# Patient Record
Sex: FEMALE | Race: WHITE | Employment: FULL TIME | URBAN - METROPOLITAN AREA
[De-identification: names, ages, dates, MRNs, and addresses within clinical notes are randomized per-mention and may not be internally consistent; named-entity substitution may affect disease eponyms.]

---

## 2020-08-25 ENCOUNTER — APPOINTMENT (OUTPATIENT)
Dept: CT IMAGING | Age: 25
DRG: 392 | End: 2020-08-25
Attending: EMERGENCY MEDICINE
Payer: COMMERCIAL

## 2020-08-25 ENCOUNTER — HOSPITAL ENCOUNTER (INPATIENT)
Age: 25
LOS: 3 days | Discharge: HOME | DRG: 392 | End: 2020-08-28
Attending: EMERGENCY MEDICINE | Admitting: SURGERY
Payer: COMMERCIAL

## 2020-08-25 DIAGNOSIS — K57.20 COLONIC DIVERTICULAR ABSCESS: Primary | ICD-10-CM

## 2020-08-25 PROBLEM — K57.92 DIVERTICULITIS: Status: ACTIVE | Noted: 2020-08-25

## 2020-08-25 LAB
ALBUMIN SERPL-MCNC: 2.8 G/DL (ref 3.4–5)
ALBUMIN/GLOB SERPL: 0.7 {RATIO} (ref 0.8–1.7)
ALP SERPL-CCNC: 133 U/L (ref 45–117)
ALT SERPL-CCNC: 105 U/L (ref 13–56)
ANION GAP SERPL CALC-SCNC: 9 MMOL/L (ref 3–18)
APPEARANCE UR: CLEAR
AST SERPL-CCNC: 34 U/L (ref 10–38)
BACTERIA URNS QL MICRO: ABNORMAL /HPF
BASOPHILS # BLD: 0 K/UL (ref 0–0.1)
BASOPHILS NFR BLD: 0 % (ref 0–2)
BILIRUB SERPL-MCNC: 0.8 MG/DL (ref 0.2–1)
BILIRUB UR QL: ABNORMAL
BUN SERPL-MCNC: 6 MG/DL (ref 7–18)
BUN/CREAT SERPL: 8 (ref 12–20)
CALCIUM SERPL-MCNC: 8.6 MG/DL (ref 8.5–10.1)
CHLORIDE SERPL-SCNC: 103 MMOL/L (ref 100–111)
CO2 SERPL-SCNC: 27 MMOL/L (ref 21–32)
COLOR UR: ABNORMAL
CREAT SERPL-MCNC: 0.72 MG/DL (ref 0.6–1.3)
DIFFERENTIAL METHOD BLD: ABNORMAL
EOSINOPHIL # BLD: 0 K/UL (ref 0–0.4)
EOSINOPHIL NFR BLD: 0 % (ref 0–5)
EPITH CASTS URNS QL MICRO: ABNORMAL /LPF (ref 0–5)
ERYTHROCYTE [DISTWIDTH] IN BLOOD BY AUTOMATED COUNT: 13.5 % (ref 11.6–14.5)
GLOBULIN SER CALC-MCNC: 4.3 G/DL (ref 2–4)
GLUCOSE SERPL-MCNC: 93 MG/DL (ref 74–99)
GLUCOSE UR STRIP.AUTO-MCNC: NEGATIVE MG/DL
HCG SERPL QL: NEGATIVE
HCT VFR BLD AUTO: 33.5 % (ref 35–45)
HGB BLD-MCNC: 10.9 G/DL (ref 12–16)
HGB UR QL STRIP: NEGATIVE
KETONES UR QL STRIP.AUTO: ABNORMAL MG/DL
LEUKOCYTE ESTERASE UR QL STRIP.AUTO: ABNORMAL
LIPASE SERPL-CCNC: 401 U/L (ref 73–393)
LYMPHOCYTES # BLD: 1.8 K/UL (ref 0.9–3.6)
LYMPHOCYTES NFR BLD: 15 % (ref 21–52)
MCH RBC QN AUTO: 26.2 PG (ref 24–34)
MCHC RBC AUTO-ENTMCNC: 32.5 G/DL (ref 31–37)
MCV RBC AUTO: 80.5 FL (ref 74–97)
MONOCYTES # BLD: 1.2 K/UL (ref 0.05–1.2)
MONOCYTES NFR BLD: 10 % (ref 3–10)
MUCOUS THREADS URNS QL MICRO: ABNORMAL /LPF
NEUTS SEG # BLD: 8.8 K/UL (ref 1.8–8)
NEUTS SEG NFR BLD: 75 % (ref 40–73)
NITRITE UR QL STRIP.AUTO: NEGATIVE
PH UR STRIP: 6.5 [PH] (ref 5–8)
PLATELET # BLD AUTO: 423 K/UL (ref 135–420)
PMV BLD AUTO: 8.3 FL (ref 9.2–11.8)
POTASSIUM SERPL-SCNC: 3.1 MMOL/L (ref 3.5–5.5)
PROT SERPL-MCNC: 7.1 G/DL (ref 6.4–8.2)
PROT UR STRIP-MCNC: 30 MG/DL
RBC # BLD AUTO: 4.16 M/UL (ref 4.2–5.3)
RBC #/AREA URNS HPF: ABNORMAL /HPF (ref 0–5)
SODIUM SERPL-SCNC: 139 MMOL/L (ref 136–145)
SP GR UR REFRACTOMETRY: 1.02 (ref 1–1.03)
UROBILINOGEN UR QL STRIP.AUTO: 2 EU/DL (ref 0.2–1)
WBC # BLD AUTO: 11.8 K/UL (ref 4.6–13.2)
WBC URNS QL MICRO: ABNORMAL /HPF (ref 0–5)

## 2020-08-25 PROCEDURE — 74011000258 HC RX REV CODE- 258: Performed by: PHYSICIAN ASSISTANT

## 2020-08-25 PROCEDURE — 65660000000 HC RM CCU STEPDOWN

## 2020-08-25 PROCEDURE — 74177 CT ABD & PELVIS W/CONTRAST: CPT

## 2020-08-25 PROCEDURE — 99285 EMERGENCY DEPT VISIT HI MDM: CPT

## 2020-08-25 PROCEDURE — 81001 URINALYSIS AUTO W/SCOPE: CPT

## 2020-08-25 PROCEDURE — 84703 CHORIONIC GONADOTROPIN ASSAY: CPT

## 2020-08-25 PROCEDURE — 74011000636 HC RX REV CODE- 636: Performed by: EMERGENCY MEDICINE

## 2020-08-25 PROCEDURE — 87635 SARS-COV-2 COVID-19 AMP PRB: CPT

## 2020-08-25 PROCEDURE — 83690 ASSAY OF LIPASE: CPT

## 2020-08-25 PROCEDURE — 74011250636 HC RX REV CODE- 250/636: Performed by: PHYSICIAN ASSISTANT

## 2020-08-25 PROCEDURE — 85025 COMPLETE CBC W/AUTO DIFF WBC: CPT

## 2020-08-25 PROCEDURE — 80053 COMPREHEN METABOLIC PANEL: CPT

## 2020-08-25 PROCEDURE — 74011250636 HC RX REV CODE- 250/636: Performed by: SURGERY

## 2020-08-25 RX ORDER — OXYCODONE AND ACETAMINOPHEN 5; 325 MG/1; MG/1
1 TABLET ORAL
Status: DISCONTINUED | OUTPATIENT
Start: 2020-08-25 | End: 2020-08-28 | Stop reason: HOSPADM

## 2020-08-25 RX ORDER — SODIUM CHLORIDE 0.9 % (FLUSH) 0.9 %
5-40 SYRINGE (ML) INJECTION AS NEEDED
Status: DISCONTINUED | OUTPATIENT
Start: 2020-08-25 | End: 2020-08-28 | Stop reason: HOSPADM

## 2020-08-25 RX ORDER — DEXTROSE, SODIUM CHLORIDE, AND POTASSIUM CHLORIDE 5; .45; .15 G/100ML; G/100ML; G/100ML
125 INJECTION INTRAVENOUS CONTINUOUS
Status: DISCONTINUED | OUTPATIENT
Start: 2020-08-25 | End: 2020-08-28 | Stop reason: HOSPADM

## 2020-08-25 RX ORDER — ONDANSETRON 2 MG/ML
4 INJECTION INTRAMUSCULAR; INTRAVENOUS
Status: DISCONTINUED | OUTPATIENT
Start: 2020-08-25 | End: 2020-08-28 | Stop reason: HOSPADM

## 2020-08-25 RX ORDER — ACETAMINOPHEN 325 MG/1
650 TABLET ORAL
Status: DISCONTINUED | OUTPATIENT
Start: 2020-08-25 | End: 2020-08-28 | Stop reason: HOSPADM

## 2020-08-25 RX ADMIN — IOPAMIDOL 100 ML: 612 INJECTION, SOLUTION INTRAVENOUS at 17:46

## 2020-08-25 RX ADMIN — PIPERACILLIN AND TAZOBACTAM 3.38 G: 3; .375 INJECTION, POWDER, LYOPHILIZED, FOR SOLUTION INTRAVENOUS at 19:42

## 2020-08-25 RX ADMIN — DEXTROSE MONOHYDRATE, SODIUM CHLORIDE, AND POTASSIUM CHLORIDE 125 ML/HR: 50; 4.5; 1.49 INJECTION, SOLUTION INTRAVENOUS at 22:54

## 2020-08-25 NOTE — ED PROVIDER NOTES
EMERGENCY DEPARTMENT HISTORY AND PHYSICAL EXAM    Date: 8/25/2020  Patient Name: Kassandra Harrison    History of Presenting Illness     Chief Complaint   Patient presents with    Abdominal Pain         History Provided By: Patient    Chief Complaint: abdominal pain    HPI(Context):   3:21 PM  Kassandra Harrison is a 30 y.o. female with PMHX of asthma who presents to the emergency department C/O abdominal pain. Associated sxs include nausea and vomiting. Sxs x 3 days. Reports pain to lower quadrants of abdomen. Worst in RLQ. Pain radiates to back. 3/10 currently. Pt seen at [facility] and told to come to ED to r/o appy. Pt tested positive for COVID on 08/14/2020. Reports her cough, fever, and myalgias have since resolved. Pt denies fever, chills, diarrhea, constipation, vaginal discharge, vaginal bleeding, dysuria, hemturia, and any other sxs or complaints. LMP 3 weeks ago. PCP: MATHEW Dean    Current Facility-Administered Medications   Medication Dose Route Frequency Provider Last Rate Last Dose    piperacillin-tazobactam (ZOSYN) 3.375 g in 0.9% sodium chloride (MBP/ADV) 100 mL MBP  3.375 g IntraVENous Q6H Pantera Pizarro PA         Current Outpatient Medications   Medication Sig Dispense Refill    fluticasone propion-salmeteroL (ADVAIR HFA) 230-21 mcg/actuation inhaler Take 1 Puff by inhalation daily. Past History     Past Medical History:  Past Medical History:   Diagnosis Date    Asthma        Past Surgical History:  Past Surgical History:   Procedure Laterality Date    HX ORTHOPAEDIC Left     wrist       Family History:  History reviewed. No pertinent family history. Social History:  Social History     Tobacco Use    Smoking status: Never Smoker    Smokeless tobacco: Never Used   Substance Use Topics    Alcohol use: Yes    Drug use: Never       Allergies:  No Known Allergies      Review of Systems   Review of Systems   Constitutional: Negative for chills and fever. Gastrointestinal: Positive for abdominal pain, nausea and vomiting. Negative for constipation and diarrhea. Genitourinary: Negative for dysuria, hematuria, vaginal bleeding and vaginal discharge. Musculoskeletal: Positive for back pain. Negative for myalgias. All other systems reviewed and are negative. Physical Exam     Vitals:    08/25/20 1513   BP: 128/73   Pulse: (!) 114   Resp: 18   Temp: 98.7 °F (37.1 °C)   SpO2: 96%   Weight: 90.7 kg (200 lb)   Height: 5' 3\" (1.6 m)     Physical Exam  Vitals signs and nursing note reviewed. Constitutional:       General: She is not in acute distress. Appearance: She is well-developed. She is not diaphoretic. Comments:  female in NAD. Alert. Appears comfortable. HENT:      Head: Normocephalic and atraumatic. Right Ear: External ear normal.      Left Ear: External ear normal.      Nose: Nose normal.   Eyes:      Conjunctiva/sclera: Conjunctivae normal.   Neck:      Musculoskeletal: Normal range of motion. Cardiovascular:      Rate and Rhythm: Normal rate and regular rhythm. Heart sounds: Normal heart sounds. No murmur. No friction rub. No gallop. Pulmonary:      Effort: Pulmonary effort is normal. No tachypnea, accessory muscle usage or respiratory distress. Breath sounds: Normal breath sounds. No decreased breath sounds, wheezing, rhonchi or rales. Abdominal:      Palpations: Abdomen is soft. Tenderness: There is abdominal tenderness in the right lower quadrant. There is guarding. There is no left CVA tenderness or rebound. Negative signs include McBurney's sign. Musculoskeletal: Normal range of motion. Skin:     General: Skin is warm and dry. Neurological:      Mental Status: She is alert and oriented to person, place, and time.    Psychiatric:         Judgment: Judgment normal.             Diagnostic Study Results     Labs -     Recent Results (from the past 12 hour(s))   URINALYSIS W/ RFLX MICROSCOPIC Collection Time: 08/25/20  3:15 PM   Result Value Ref Range    Color DARK YELLOW      Appearance CLEAR      Specific gravity 1.023 1.005 - 1.030      pH (UA) 6.5 5.0 - 8.0      Protein 30 (A) NEG mg/dL    Glucose Negative NEG mg/dL    Ketone TRACE (A) NEG mg/dL    Bilirubin MODERATE (A) NEG      Blood Negative NEG      Urobilinogen 2.0 (H) 0.2 - 1.0 EU/dL    Nitrites Negative NEG      Leukocyte Esterase TRACE (A) NEG     URINE MICROSCOPIC ONLY    Collection Time: 08/25/20  3:15 PM   Result Value Ref Range    WBC 2 to 5 0 - 5 /hpf    RBC 0 to 3 0 - 5 /hpf    Epithelial cells FEW 0 - 5 /lpf    Bacteria 1+ (A) NEG /hpf    Mucus 1+ (A) NEG /lpf   CBC WITH AUTOMATED DIFF    Collection Time: 08/25/20  4:00 PM   Result Value Ref Range    WBC 11.8 4.6 - 13.2 K/uL    RBC 4.16 (L) 4.20 - 5.30 M/uL    HGB 10.9 (L) 12.0 - 16.0 g/dL    HCT 33.5 (L) 35.0 - 45.0 %    MCV 80.5 74.0 - 97.0 FL    MCH 26.2 24.0 - 34.0 PG    MCHC 32.5 31.0 - 37.0 g/dL    RDW 13.5 11.6 - 14.5 %    PLATELET 568 (H) 694 - 420 K/uL    MPV 8.3 (L) 9.2 - 11.8 FL    NEUTROPHILS 75 (H) 40 - 73 %    LYMPHOCYTES 15 (L) 21 - 52 %    MONOCYTES 10 3 - 10 %    EOSINOPHILS 0 0 - 5 %    BASOPHILS 0 0 - 2 %    ABS. NEUTROPHILS 8.8 (H) 1.8 - 8.0 K/UL    ABS. LYMPHOCYTES 1.8 0.9 - 3.6 K/UL    ABS. MONOCYTES 1.2 0.05 - 1.2 K/UL    ABS. EOSINOPHILS 0.0 0.0 - 0.4 K/UL    ABS.  BASOPHILS 0.0 0.0 - 0.1 K/UL    DF AUTOMATED     METABOLIC PANEL, COMPREHENSIVE    Collection Time: 08/25/20  4:00 PM   Result Value Ref Range    Sodium 139 136 - 145 mmol/L    Potassium 3.1 (L) 3.5 - 5.5 mmol/L    Chloride 103 100 - 111 mmol/L    CO2 27 21 - 32 mmol/L    Anion gap 9 3.0 - 18 mmol/L    Glucose 93 74 - 99 mg/dL    BUN 6 (L) 7.0 - 18 MG/DL    Creatinine 0.72 0.6 - 1.3 MG/DL    BUN/Creatinine ratio 8 (L) 12 - 20      GFR est AA >60 >60 ml/min/1.73m2    GFR est non-AA >60 >60 ml/min/1.73m2    Calcium 8.6 8.5 - 10.1 MG/DL    Bilirubin, total 0.8 0.2 - 1.0 MG/DL    ALT (SGPT) 105 (H) 13 - 56 U/L    AST (SGOT) 34 10 - 38 U/L    Alk. phosphatase 133 (H) 45 - 117 U/L    Protein, total 7.1 6.4 - 8.2 g/dL    Albumin 2.8 (L) 3.4 - 5.0 g/dL    Globulin 4.3 (H) 2.0 - 4.0 g/dL    A-G Ratio 0.7 (L) 0.8 - 1.7     LIPASE    Collection Time: 08/25/20  4:00 PM   Result Value Ref Range    Lipase 401 (H) 73 - 393 U/L   HCG QL SERUM    Collection Time: 08/25/20  4:00 PM   Result Value Ref Range    HCG, Ql. Negative NEG             CT ABD PELV W CONT   Final Result   IMPRESSION:         1. 6 cm diverticular abscess with marked surrounding inflammation. This arises   from the proximal half of the sigmoid. 2. Possible groundglass infiltrates in the lung bases, indeterminate due to the   severity of respiratory motion. 3. Moderate hepatic steatosis. CT Results  (Last 48 hours)               08/25/20 1750  CT ABD PELV W CONT Final result    Impression:  IMPRESSION:           1. 6 cm diverticular abscess with marked surrounding inflammation. This arises   from the proximal half of the sigmoid. 2. Possible groundglass infiltrates in the lung bases, indeterminate due to the   severity of respiratory motion. 3. Moderate hepatic steatosis. Narrative:  EXAM: CT ABD PELV W CONT       CLINICAL INDICATION/HISTORY:  rlq abd pain.     > Additional: None       COMPARISON: None     > Correlative imaging: None. TECHNIQUE: Axial CT imaging of the abdomen and pelvis was performed with   intravenous contrast. Multiplanar reformats were generated. One or more dose   reduction techniques were used on this CT: automated exposure control,   adjustment of the mAs and/or kVp according to patient size, and iterative   reconstruction techniques. The specific techniques used on this CT exam have   been documented in the patient's electronic medical record.  Digital imaging and   communications in medicine (DICOM) format image data are available to   nonaffiliated external healthcare facilities or entities on a secure, media   free, reciprocally searchable basis with patient authorization for at least a 12   month period after this study. _______________       FINDINGS:       General comment: Exam quality is moderately degraded by motion, especially in   the lung bases and in the mid abdomen. LOWER CHEST: There is apparent groundglass density in both lung bases, although   severity of respiratory motion precludes evaluation. LIVER, BILIARY:      > Liver: Moderately diminished in attenuation diffusely.     > Biliary: Unremarkable. SPLEEN: Unremarkable. PANCREAS: Unremarkable. ADRENALS: Unremarkable. KIDNEYS/URETERS/BLADDER: Unremarkable. PELVIC ORGANS: Unremarkable. GASTROINTESTINAL TRACT: There is marked inflammation in the left side of the   pelvis which arises from the proximal half of the sigmoid. There is a large   diverticular abscess in this region with poorly defined walls but roughly 6 x 6   x 4 cm. There are multiple small gas bubbles in this region. Whether they are   contained within a large diverticulum or are extraluminal within the adjacent   fat is unknown and ultimately indeterminate. VASCULATURE: Unremarkable. LYMPH NODES: No enlarged lymph nodes. OTHER: None. MUSCULOSKELETAL: Unremarkable.       _______________               CXR Results  (Last 48 hours)    None          Medications given in the ED-  Medications   piperacillin-tazobactam (ZOSYN) 3.375 g in 0.9% sodium chloride (MBP/ADV) 100 mL MBP (has no administration in time range)   iopamidoL (ISOVUE 300) 61 % contrast injection 100 mL (100 mL IntraVENous Given 8/25/20 1746)         Medical Decision Making   I am the first provider for this patient. I reviewed the vital signs, available nursing notes, past medical history, past surgical history, family history and social history. Vital Signs-Reviewed the patient's vital signs.     Pulse Oximetry Analysis - 96% on RA. NORMAL     Records Reviewed: Nursing Notes    Provider Notes (Medical Decision Making): appy, colitis, diverticulitis, gastroenteritis, pancreatitis, hepatitis, UTI/pyelo, stone    Procedures:  Procedures    ED Course:   3:21 PM Initial assessment performed. The patients presenting problems have been discussed, and they are in agreement with the care plan formulated and outlined with them. I have encouraged them to ask questions as they arise throughout their visit. Diagnosis and Disposition       5:03 PM  Patient's presentation, labs/imaging and plan of care was reviewed with Chino Ramirez PA-C as part of sign out. They will FU on CT abdomen/pelvis as part of the plan discussed with the patient. Emmy Samantha Pizarro's assistance in completion of this plan is greatly appreciated but it should be noted that I will be the provider of record for this patient. Written by Justyna Vick PA-C    6:53 PM  Reviewed CT scan and rounds with Dr. Shanel Small ED attending. Remarkable for normal white count with shift and 6 cm diverticular abscess. Will give Zosyn and consult with general surgery  MATHEW Reed     7:02 PM  Reviewed case with Dr. Gina Francisco general surgery agrees with Zosyn and admission to his service. Likely get percutaneous drainage tomorrow. Also discussed CT findings of groundglass appearance noted in the lungs. Patient has history of known chlamydia infection with positive test 2 weeks ago. Patient has no coughing no fever no chest pain or shortness of breath. She does endorse respiratory infection however was resolved      Critical Care Time: 0    Core Measures:  For Hospitalized Patients:    1. Hospitalization Decision Time:  The decision to hospitalize the patient was made by MATHEW Reed  at 7:05 PM  on 8/25/2020    2.  Aspirin: Aspirin was not given because the patient did not present with a stroke at the time of their Emergency Department evaluation    7:05 PM  Patient is being admitted to the hospital by Dr Raya Villafuerte . The results of their tests and reasons for their admission have been discussed with them and/or available family. They convey agreement and understanding for the need to be admitted and for their admission diagnosis. CONDITIONS ON ADMISSION:  Sepsis is not present at the time of admission. Deep Vein Thrombosis is not present at the time of admission. Thrombosis is not present at the time of admission. Urinary Tract Infection is not present at the time of admission. Wound infection is not present at the time of admission. Pressure Ulcer is not present at the time of admission. CLINICAL IMPRESSION:    1. Colonic diverticular abscess              Please note that this dictation was completed with Evernote, the computer voice recognition software. Quite often unanticipated grammatical, syntax, homophones, and other interpretive errors are inadvertently transcribed by the computer software. Please disregard these errors. Please excuse any errors that have escaped final proofreading.

## 2020-08-25 NOTE — ED TRIAGE NOTES
Pt w/ c/o RIGHT to LEFT lower abd pain starting 3 days ago w/ nausea and cramping. Pt states she went to [facility] to here and was told to come here for appendicitis rule out. Pt denies fevers, chest pain, cough or SOB. Pt reports she was COVID+ on 8/14 and completed her quarantine.

## 2020-08-26 ENCOUNTER — APPOINTMENT (OUTPATIENT)
Dept: CT IMAGING | Age: 25
DRG: 392 | End: 2020-08-26
Attending: SURGERY
Payer: COMMERCIAL

## 2020-08-26 PROBLEM — U07.1 COVID-19: Status: ACTIVE | Noted: 2020-08-26

## 2020-08-26 LAB
ANION GAP SERPL CALC-SCNC: 8 MMOL/L (ref 3–18)
APTT PPP: 82.1 SEC (ref 23–36.4)
BASOPHILS # BLD: 0 K/UL (ref 0–0.1)
BASOPHILS NFR BLD: 0 % (ref 0–3)
BUN SERPL-MCNC: 6 MG/DL (ref 7–18)
BUN/CREAT SERPL: 9 (ref 12–20)
CALCIUM SERPL-MCNC: 8.3 MG/DL (ref 8.5–10.1)
CHLORIDE SERPL-SCNC: 104 MMOL/L (ref 100–111)
CO2 SERPL-SCNC: 26 MMOL/L (ref 21–32)
CREAT SERPL-MCNC: 0.69 MG/DL (ref 0.6–1.3)
DIFFERENTIAL METHOD BLD: ABNORMAL
EOSINOPHIL # BLD: 0.1 K/UL (ref 0–0.4)
EOSINOPHIL NFR BLD: 1 % (ref 0–5)
ERYTHROCYTE [DISTWIDTH] IN BLOOD BY AUTOMATED COUNT: 13.6 % (ref 11.6–14.5)
GLUCOSE SERPL-MCNC: 104 MG/DL (ref 74–99)
HCT VFR BLD AUTO: 32.2 % (ref 35–45)
HGB BLD-MCNC: 10.7 G/DL (ref 12–16)
INR PPP: 1.4 (ref 0.8–1.2)
LYMPHOCYTES # BLD: 2.3 K/UL (ref 0.8–3.5)
LYMPHOCYTES NFR BLD: 30 % (ref 20–51)
MCH RBC QN AUTO: 26.5 PG (ref 24–34)
MCHC RBC AUTO-ENTMCNC: 33.2 G/DL (ref 31–37)
MCV RBC AUTO: 79.7 FL (ref 74–97)
MONOCYTES # BLD: 0.6 K/UL (ref 0–1)
MONOCYTES NFR BLD: 8 % (ref 2–9)
NEUTS SEG # BLD: 4.7 K/UL (ref 1.8–8)
NEUTS SEG NFR BLD: 61 % (ref 42–75)
PLATELET # BLD AUTO: 454 K/UL (ref 135–420)
PLATELET COMMENTS,PCOM: ABNORMAL
PMV BLD AUTO: 9 FL (ref 9.2–11.8)
POTASSIUM SERPL-SCNC: 3.4 MMOL/L (ref 3.5–5.5)
PROTHROMBIN TIME: 17.1 SEC (ref 11.5–15.2)
RBC # BLD AUTO: 4.04 M/UL (ref 4.2–5.3)
RBC MORPH BLD: ABNORMAL
SODIUM SERPL-SCNC: 138 MMOL/L (ref 136–145)
WBC # BLD AUTO: 7.7 K/UL (ref 4.6–13.2)

## 2020-08-26 PROCEDURE — 36415 COLL VENOUS BLD VENIPUNCTURE: CPT

## 2020-08-26 PROCEDURE — 80048 BASIC METABOLIC PNL TOTAL CA: CPT

## 2020-08-26 PROCEDURE — 85610 PROTHROMBIN TIME: CPT

## 2020-08-26 PROCEDURE — 74011250636 HC RX REV CODE- 250/636: Performed by: SURGERY

## 2020-08-26 PROCEDURE — 85730 THROMBOPLASTIN TIME PARTIAL: CPT

## 2020-08-26 PROCEDURE — 74011250637 HC RX REV CODE- 250/637: Performed by: SURGERY

## 2020-08-26 PROCEDURE — 85025 COMPLETE CBC W/AUTO DIFF WBC: CPT

## 2020-08-26 PROCEDURE — 74011000258 HC RX REV CODE- 258: Performed by: SURGERY

## 2020-08-26 PROCEDURE — 65660000000 HC RM CCU STEPDOWN

## 2020-08-26 RX ORDER — LORAZEPAM 1 MG/1
1 TABLET ORAL
Status: DISCONTINUED | OUTPATIENT
Start: 2020-08-26 | End: 2020-08-28 | Stop reason: HOSPADM

## 2020-08-26 RX ADMIN — DEXTROSE MONOHYDRATE, SODIUM CHLORIDE, AND POTASSIUM CHLORIDE 125 ML/HR: 50; 4.5; 1.49 INJECTION, SOLUTION INTRAVENOUS at 06:54

## 2020-08-26 RX ADMIN — PIPERACILLIN AND TAZOBACTAM 3.38 G: 3; .375 INJECTION, POWDER, LYOPHILIZED, FOR SOLUTION INTRAVENOUS at 14:24

## 2020-08-26 RX ADMIN — PIPERACILLIN AND TAZOBACTAM 3.38 G: 3; .375 INJECTION, POWDER, LYOPHILIZED, FOR SOLUTION INTRAVENOUS at 02:58

## 2020-08-26 RX ADMIN — DEXTROSE MONOHYDRATE, SODIUM CHLORIDE, AND POTASSIUM CHLORIDE 125 ML/HR: 50; 4.5; 1.49 INJECTION, SOLUTION INTRAVENOUS at 23:42

## 2020-08-26 RX ADMIN — DEXTROSE MONOHYDRATE, SODIUM CHLORIDE, AND POTASSIUM CHLORIDE 125 ML/HR: 50; 4.5; 1.49 INJECTION, SOLUTION INTRAVENOUS at 16:00

## 2020-08-26 RX ADMIN — PIPERACILLIN AND TAZOBACTAM 3.38 G: 3; .375 INJECTION, POWDER, LYOPHILIZED, FOR SOLUTION INTRAVENOUS at 08:32

## 2020-08-26 RX ADMIN — LORAZEPAM 1 MG: 1 TABLET ORAL at 20:39

## 2020-08-26 RX ADMIN — PIPERACILLIN AND TAZOBACTAM 3.38 G: 3; .375 INJECTION, POWDER, LYOPHILIZED, FOR SOLUTION INTRAVENOUS at 20:40

## 2020-08-26 NOTE — ED NOTES
TRANSFER - OUT REPORT:    Verbal report given to Mela Kee RN (name) on [facility] Lazo  being transferred to [place] [id number] (unit) for routine progression of care       Report consisted of patients Situation, Background, Assessment and   Recommendations(SBAR). Information from the following report(s) SBAR, ED Summary and MAR was reviewed with the receiving nurse. Lines:   Peripheral IV 08/25/20 Left Hand (Active)   Site Assessment Clean, dry, & intact 08/25/20 1625   Phlebitis Assessment 0 08/25/20 1625   Infiltration Assessment 0 08/25/20 1625   Dressing Status Clean, dry, & intact 08/25/20 1625   Dressing Type Tape;Transparent 08/25/20 1625   Hub Color/Line Status Blue 08/25/20 1625   Action Taken Blood drawn 08/25/20 1625   Alcohol Cap Used Yes 08/25/20 1625       Peripheral IV 08/25/20 Right Antecubital (Active)   Site Assessment Clean, dry, & intact 08/25/20 1725   Phlebitis Assessment 0 08/25/20 1725   Infiltration Assessment 0 08/25/20 1725   Dressing Status Clean, dry, & intact 08/25/20 1725   Dressing Type Tape;Transparent 08/25/20 1725   Hub Color/Line Status Pink 08/25/20 1725   Action Taken Blood drawn 08/25/20 1725   Alcohol Cap Used Yes 08/25/20 1725        Opportunity for questions and clarification was provided. Patient transported with:   Tech     Pt to floor AOx4, SL x2 in place. Transported in a w/c. Has only had ice chips in ED approx 1 hour ago.

## 2020-08-26 NOTE — PROGRESS NOTES
1315: pt advocate made aware of pt concern on another night stay in hospital due to surgery cancelled  Maria De Jesus Gresham RN

## 2020-08-26 NOTE — ROUTINE PROCESS
Bedside and Verbal shift change report given to Zoraida Ribera RN (oncoming nurse) by Jose Das RN (offgoing nurse). Report included the following information SBAR and Kardex.

## 2020-08-26 NOTE — PROGRESS NOTES
Problem: Pressure Injury - Risk of  Goal: *Prevention of pressure injury  Description: Document Michel Scale and appropriate interventions in the flowsheet. Outcome: Progressing Towards Goal  Note: Pressure Injury Interventions:                                            Problem: Patient Education: Go to Patient Education Activity  Goal: Patient/Family Education  Outcome: Progressing Towards Goal     Problem: Airway Clearance - Ineffective  Goal: Achieve or maintain patent airway  Outcome: Progressing Towards Goal     Problem: Gas Exchange - Impaired  Goal: Absence of hypoxia  Outcome: Progressing Towards Goal  Goal: Promote optimal lung function  Outcome: Progressing Towards Goal     Problem: Gas Exchange - Impaired  Goal: Absence of hypoxia  Outcome: Progressing Towards Goal  Goal: Promote optimal lung function  Outcome: Progressing Towards Goal     Problem: Breathing Pattern - Ineffective  Goal: Ability to achieve and maintain a regular respiratory rate  Outcome: Progressing Towards Goal     Problem:  Body Temperature -  Risk of, Imbalanced  Goal: Ability to maintain a body temperature within defined limits  Outcome: Progressing Towards Goal  Goal: Will regain or maintain usual level of consciousness  Outcome: Progressing Towards Goal  Goal: Complications related to the disease process, condition or treatment will be avoided or minimized  Outcome: Progressing Towards Goal     Problem: Isolation Precautions - Risk of Spread of Infection  Goal: Prevent transmission of infectious organism to others  Outcome: Progressing Towards Goal     Problem: Nutrition Deficits  Goal: Optimize nutrtional status  Outcome: Progressing Towards Goal     Problem: Risk for Fluid Volume Deficit  Goal: Maintain normal heart rhythm  Outcome: Progressing Towards Goal  Goal: Maintain absence of muscle cramping  Outcome: Progressing Towards Goal  Goal: Maintain normal serum potassium, sodium, calcium, phosphorus, and pH  Outcome: Progressing Towards Goal     Problem: Loneliness or Risk for Loneliness  Goal: Demonstrate positive use of time alone when socialization is not possible  Outcome: Progressing Towards Goal     Problem: Fatigue  Goal: Verbalize increase energy and improved vitality  Outcome: Progressing Towards Goal     Problem: Patient Education: Go to Patient Education Activity  Goal: Patient/Family Education  Outcome: Progressing Towards Goal

## 2020-08-26 NOTE — ED NOTES
RN in to assess pt. Repositioned for comfort. Additional needs assessed. No apparent distress. No additional needs expressed at this time. Resting comfortably, awaiting further orders/procedures.       Pt given ice chips, with Ok of provider

## 2020-08-26 NOTE — ROUTINE PROCESS
Assume care of patient from Tom Giraldo RN(ED). Patient received in bed awake. Patient A&Ox4, denies pain and discomfort. No distress noted. Bed locked in low position. Call bell within reach and patient verbalized understanding of use for assistance and needs. Droplet PLUS precautions implemented. [facility] Night Shift Chart Audit Chart Audit completed? YES 
 
2325- Bedside and Verbal shift change report given to Sina Arciniega RN (oncoming nurse) by Steve Lin RN (offgoing nurse). Report included the following information SBAR, Kardex, Intake/Output, MAR and Recent Results.

## 2020-08-26 NOTE — H&P
History & Physical    Patient: Kel Humphrey MRN: [id number]  CSN: [id number]    YOB: 1995  Age: 30 y.o. Sex: female      DOA: 8/25/2020       HPI:     Kel Humphrey is a 30 y.o. female who presents with perforated diverticular abscess. Seen on CT. Past Medical History:   Diagnosis Date    Asthma        Past Surgical History:   Procedure Laterality Date    HX ORTHOPAEDIC Left     wrist       History reviewed. No pertinent family history. Social History     Socioeconomic History    Marital status: SINGLE     Spouse name: Not on file    Number of children: Not on file    Years of education: Not on file    Highest education level: Not on file   Tobacco Use    Smoking status: Never Smoker    Smokeless tobacco: Never Used   Substance and Sexual Activity    Alcohol use: Yes    Drug use: Never       Prior to Admission medications    Medication Sig Start Date End Date Taking? Authorizing Provider   fluticasone propion-salmeteroL (ADVAIR HFA) 438-46 mcg/actuation inhaler Take 1 Puff by inhalation daily. Yes Other, [facility]       No Known Allergies    Review of Systems  A comprehensive review of systems was negative except for that written in the History of Present Illness. Physical Exam:      Visit Vitals  /71   Pulse 78   Temp 98.2 °F (36.8 °C)   Resp 18   Ht 5' 3\" (1.6 m)   Wt 90.7 kg (200 lb)   SpO2 99%   BMI 35.43 kg/m²       Physical Exam:  Physical Exam:   General:  Alert, cooperative, no distress, appears stated age. Eyes:  Conjunctivae/corneas clear. PERRL, EOMs intact. Fundi benign   Ears:  Normal TMs and external ear canals both ears. Nose: Nares normal. Septum midline. Mucosa normal. No drainage or sinus tenderness. Mouth/Throat: Lips, mucosa, and tongue normal. Teeth and gums normal.   Neck: Supple, symmetrical, trachea midline, no adenopathy, thyroid: no enlargement/tenderness/nodules, no carotid bruit and no JVD. Back:   Symmetric, no curvature.  ROM normal. No CVA tenderness. Lungs:   Clear to auscultation bilaterally. Heart:  Regular rate and rhythm, S1, S2 normal, no murmur, click, rub or gallop. Abdomen:   Soft, tender LLQ. Bowel sounds normal. No masses,  No organomegaly. Extremities: Extremities normal, atraumatic, no cyanosis or edema. Pulses: 2+ and symmetric all extremities. Skin: Skin color, texture, turgor normal. No rashes or lesions   Lymph nodes: Cervical, supraclavicular, and axillary nodes normal.   Neurologic: CNII-XII intact. Normal strength, sensation and reflexes throughout. Labs Reviewed: All lab results for the last 24 hours reviewed. Assessment/Plan     Active Problems:    Colonic diverticular abscess (8/25/2020)      Diverticulitis (8/25/2020)        Plan to drain abscess by CT, IV abx.   Patient with COVID infection in past.

## 2020-08-26 NOTE — PROGRESS NOTES
80 Page Dr Fernandez Rios about pt feeling anxious and pt states she takes Klonopin but do no remember the dose. 1753 Orders received Ativan 1 mg PO every 8 hours.

## 2020-08-26 NOTE — PROGRESS NOTES
attempted phone call with patient. There was no answer. Chaplains remain available to provide pastoral support to patient and family as needed and requested. Rev.  Darren Armstrong  [phone]

## 2020-08-26 NOTE — PROGRESS NOTES
DC Plan: TBD    Chart reviewed. Pt admitted to tele by [facility] for abscess. Pt covid pending 8/25, pt noted to be covid + 8/14 at another facility. CM attempted to call pt in room to discuss dc plan, no answer. Pts listed as [facility]. CM will follow for transition of care needs [phone]    . Reason for Admission:   Per H&P pt is \"is a 30 y.o. female who presents with perforated diverticular abscess. Seen on CT. \"                   RUR Score:   low                  Plan for utilizing home health:     TBD Provider if [facility] needed please place order     PCP: First and Last name:  [facility]   Name of Practice:    Are you a current patient: Yes/No:    Approximate date of last visit:    Can you participate in a virtual visit with your PCP:                     Current Advanced Directive/Advance Care Plan: not on file                         Transition of Care Plan:      Home                  Care Management Interventions  Transition of Care Consult (CM Consult): Discharge Planning  Discharge Location  Discharge Placement: Home

## 2020-08-26 NOTE — PROGRESS NOTES
Problem: Pressure Injury - Risk of  Goal: *Prevention of pressure injury  Description: Document Michel Scale and appropriate interventions in the flowsheet. Outcome: Progressing Towards Goal  Note: Pressure Injury Interventions:                                            Problem: Patient Education: Go to Patient Education Activity  Goal: Patient/Family Education  Outcome: Progressing Towards Goal     Problem: Airway Clearance - Ineffective  Goal: Achieve or maintain patent airway  Outcome: Progressing Towards Goal     Problem: Gas Exchange - Impaired  Goal: Absence of hypoxia  Outcome: Progressing Towards Goal  Goal: Promote optimal lung function  Outcome: Progressing Towards Goal     Problem: Breathing Pattern - Ineffective  Goal: Ability to achieve and maintain a regular respiratory rate  Outcome: Progressing Towards Goal     Problem:  Body Temperature -  Risk of, Imbalanced  Goal: Ability to maintain a body temperature within defined limits  Outcome: Progressing Towards Goal  Goal: Will regain or maintain usual level of consciousness  Outcome: Progressing Towards Goal  Goal: Complications related to the disease process, condition or treatment will be avoided or minimized  Outcome: Progressing Towards Goal     Problem: Isolation Precautions - Risk of Spread of Infection  Goal: Prevent transmission of infectious organism to others  Outcome: Progressing Towards Goal     Problem: Nutrition Deficits  Goal: Optimize nutrtional status  Outcome: Progressing Towards Goal     Problem: Risk for Fluid Volume Deficit  Goal: Maintain normal heart rhythm  Outcome: Progressing Towards Goal  Goal: Maintain absence of muscle cramping  Outcome: Progressing Towards Goal  Goal: Maintain normal serum potassium, sodium, calcium, phosphorus, and pH  Outcome: Progressing Towards Goal     Problem: Loneliness or Risk for Loneliness  Goal: Demonstrate positive use of time alone when socialization is not possible  Outcome: Progressing Towards Goal     Problem: Fatigue  Goal: Verbalize increase energy and improved vitality  Outcome: Progressing Towards Goal     Problem: Patient Education: Go to Patient Education Activity  Goal: Patient/Family Education  Outcome: Progressing Towards Goal

## 2020-08-27 ENCOUNTER — HOSPITAL ENCOUNTER (INPATIENT)
Dept: CT IMAGING | Age: 25
Discharge: HOME | DRG: 392 | End: 2020-08-27
Attending: SURGERY
Payer: COMMERCIAL

## 2020-08-27 PROCEDURE — 74011000258 HC RX REV CODE- 258: Performed by: SURGERY

## 2020-08-27 PROCEDURE — 65660000000 HC RM CCU STEPDOWN

## 2020-08-27 PROCEDURE — 75989 ABSCESS DRAINAGE UNDER X-RAY: CPT

## 2020-08-27 PROCEDURE — 74011250636 HC RX REV CODE- 250/636: Performed by: SURGERY

## 2020-08-27 PROCEDURE — 0W9G3ZZ DRAINAGE OF PERITONEAL CAVITY, PERCUTANEOUS APPROACH: ICD-10-PCS | Performed by: RADIOLOGY

## 2020-08-27 RX ORDER — FENTANYL CITRATE 50 UG/ML
25-200 INJECTION, SOLUTION INTRAMUSCULAR; INTRAVENOUS
Status: DISCONTINUED | OUTPATIENT
Start: 2020-08-27 | End: 2020-08-27 | Stop reason: ALTCHOICE

## 2020-08-27 RX ORDER — MIDAZOLAM HYDROCHLORIDE 1 MG/ML
INJECTION, SOLUTION INTRAMUSCULAR; INTRAVENOUS
Status: DISCONTINUED
Start: 2020-08-27 | End: 2020-08-27 | Stop reason: WASHOUT

## 2020-08-27 RX ORDER — NALOXONE HYDROCHLORIDE 0.4 MG/ML
0.4 INJECTION, SOLUTION INTRAMUSCULAR; INTRAVENOUS; SUBCUTANEOUS AS NEEDED
Status: DISCONTINUED | OUTPATIENT
Start: 2020-08-27 | End: 2020-08-27 | Stop reason: ALTCHOICE

## 2020-08-27 RX ORDER — LIDOCAINE HYDROCHLORIDE 10 MG/ML
INJECTION INFILTRATION; PERINEURAL
Status: DISCONTINUED
Start: 2020-08-27 | End: 2020-08-27 | Stop reason: ALTCHOICE

## 2020-08-27 RX ORDER — NALOXONE HYDROCHLORIDE 0.4 MG/ML
INJECTION, SOLUTION INTRAMUSCULAR; INTRAVENOUS; SUBCUTANEOUS
Status: DISCONTINUED
Start: 2020-08-27 | End: 2020-08-27 | Stop reason: ALTCHOICE

## 2020-08-27 RX ORDER — FENTANYL CITRATE 50 UG/ML
INJECTION, SOLUTION INTRAMUSCULAR; INTRAVENOUS
Status: DISCONTINUED
Start: 2020-08-27 | End: 2020-08-27 | Stop reason: WASHOUT

## 2020-08-27 RX ORDER — FLUMAZENIL 0.1 MG/ML
0.2 INJECTION INTRAVENOUS
Status: DISCONTINUED | OUTPATIENT
Start: 2020-08-27 | End: 2020-08-27 | Stop reason: ALTCHOICE

## 2020-08-27 RX ORDER — LIDOCAINE HYDROCHLORIDE 10 MG/ML
1-20 INJECTION INFILTRATION; PERINEURAL
Status: DISCONTINUED | OUTPATIENT
Start: 2020-08-27 | End: 2020-08-27 | Stop reason: ALTCHOICE

## 2020-08-27 RX ORDER — SODIUM CHLORIDE 9 MG/ML
25 INJECTION, SOLUTION INTRAVENOUS CONTINUOUS
Status: DISCONTINUED | OUTPATIENT
Start: 2020-08-27 | End: 2020-08-27 | Stop reason: ALTCHOICE

## 2020-08-27 RX ORDER — MIDAZOLAM HYDROCHLORIDE 1 MG/ML
.5-4 INJECTION, SOLUTION INTRAMUSCULAR; INTRAVENOUS
Status: DISCONTINUED | OUTPATIENT
Start: 2020-08-27 | End: 2020-08-27 | Stop reason: ALTCHOICE

## 2020-08-27 RX ORDER — FLUMAZENIL 0.1 MG/ML
INJECTION INTRAVENOUS
Status: DISCONTINUED
Start: 2020-08-27 | End: 2020-08-27 | Stop reason: WASHOUT

## 2020-08-27 RX ADMIN — DEXTROSE MONOHYDRATE, SODIUM CHLORIDE, AND POTASSIUM CHLORIDE 125 ML/HR: 50; 4.5; 1.49 INJECTION, SOLUTION INTRAVENOUS at 18:49

## 2020-08-27 RX ADMIN — PIPERACILLIN AND TAZOBACTAM 3.38 G: 3; .375 INJECTION, POWDER, LYOPHILIZED, FOR SOLUTION INTRAVENOUS at 21:34

## 2020-08-27 RX ADMIN — DEXTROSE MONOHYDRATE, SODIUM CHLORIDE, AND POTASSIUM CHLORIDE 125 ML/HR: 50; 4.5; 1.49 INJECTION, SOLUTION INTRAVENOUS at 09:52

## 2020-08-27 RX ADMIN — PIPERACILLIN AND TAZOBACTAM 3.38 G: 3; .375 INJECTION, POWDER, LYOPHILIZED, FOR SOLUTION INTRAVENOUS at 09:52

## 2020-08-27 RX ADMIN — PIPERACILLIN AND TAZOBACTAM 3.38 G: 3; .375 INJECTION, POWDER, LYOPHILIZED, FOR SOLUTION INTRAVENOUS at 17:00

## 2020-08-27 RX ADMIN — PIPERACILLIN AND TAZOBACTAM 3.38 G: 3; .375 INJECTION, POWDER, LYOPHILIZED, FOR SOLUTION INTRAVENOUS at 02:40

## 2020-08-27 NOTE — ROUTINE PROCESS
Bedside and Verbal shift change report given to MARYLIN Montelongo RN (oncoming nurse) by Carlie Pleitez RN (offgoing nurse). Report included the following information SBAR, Kardex, Intake/Output and MAR.

## 2020-08-27 NOTE — ROUTINE PROCESS
Verbal shift change report given to Christy Pac RN by Lizzy Lau RN. Report included the following information SBAR, Kardex, Intake/Output and MAR.

## 2020-08-27 NOTE — H&P
History and Physical reviewed; I have examined the patient and there are no pertinent changes. Patient in IR for CT guided drainage of pelvic abscess.

## 2020-08-27 NOTE — PROGRESS NOTES
2040-alert and oriented x 4. Lungs CTA. BS active x 4 quads, but abdomen is tender. Patient denies pain at this time. IV access to right AC and left hand, both infusing without difficulty. On box 18 showing NSR. Patient given Ativan for anxiety and talked with her about procedure she would be having done in the morning. Patient is aware of her diet status of clear liquids now and NPO after midnight. 0615-CHG wipes completed. Marie Stinson from radiology called and will be    Shift summary-Ambulates independently, voiding without difficulty. IV ATB given with no adverse effects noted. Patient denied need for pain medication through the noc.

## 2020-08-27 NOTE — PROGRESS NOTES
1200 [facility] at this time. 2133 - Patient A&Ox4, RA. Denies chest pain and SOB. Respirations even and unlabored, no signs of distress. Pain 0/10. Pt educated on COVID-19 contact status and need to minimize contact. Pt educated on methods utilized for communication (call bell/zone phone/). Pt verbalized understanding, no concerns voiced. Call bell within reach, bed in lowest position. Pt encouraged to call for assistance. 1470 - Spoke with  regarding inability to obtain ordered labs. Phlebotomist to draw labs.

## 2020-08-27 NOTE — PROGRESS NOTES
TRANSFER - OUT REPORT:    Verbal report given to [facility], RN(name) on Nancy Phillips  being transferred to room 349(unit) for routine progression of care       Report consisted of patients Situation, Background, Assessment and   Recommendations(SBAR). Information from the following report(s) SBAR, Kardex and MAR was reviewed with the receiving nurse. Lines:   Peripheral IV 08/25/20 Left Hand (Active)   Site Assessment Clean, dry, & intact 08/26/20 2040   Phlebitis Assessment 0 08/26/20 2040   Infiltration Assessment 0 08/26/20 2040   Dressing Status Clean, dry, & intact 08/26/20 2040   Dressing Type Tape;Transparent 08/26/20 2040   Hub Color/Line Status Infusing;Blue 08/26/20 2040   Action Taken Open ports on tubing capped 08/26/20 2040   Alcohol Cap Used Yes 08/26/20 2040       Peripheral IV 08/25/20 Right Antecubital (Active)   Site Assessment Clean, dry, & intact 08/26/20 2040   Phlebitis Assessment 0 08/26/20 2040   Infiltration Assessment 0 08/26/20 2040   Dressing Status Clean, dry, & intact 08/26/20 2040   Dressing Type Tape;Transparent 08/26/20 2040   Hub Color/Line Status Pink; Infusing 08/26/20 2040   Action Taken Open ports on tubing capped 08/26/20 2040   Alcohol Cap Used Yes 08/26/20 2040        Opportunity for questions and clarification was provided.       Patient transported with:   pt  transporter

## 2020-08-27 NOTE — PROGRESS NOTES
AFVSS  Abd - less pain LLQ    CT today was improved.   Discussed with radiologist.  Will continue IV abx  Recheck CBC in AM  Hope to send home on oral abx  COVID pending

## 2020-08-27 NOTE — PROGRESS NOTES
Dr. Meghan Flores spoke with Dr. Griselda Rosario on phone regarding this morning CT images prior to planned abscess drainage. Did not proceed with procedure based on today's images. Dr. Meghan Flores updated pt to current situation, images, statuses, and plan of care. Pt verbalized understanding. Informed Niurka Hatch RN of plan of care.

## 2020-08-27 NOTE — ROUTINE PROCESS
Bedside and Verbal shift change report given to SRINI Blair RN (oncoming nurse) by Theresa Nichole RN (offgoing nurse). Report included the following information SBAR, Kardex, Intake/Output and MAR.

## 2020-08-28 VITALS
RESPIRATION RATE: 18 BRPM | WEIGHT: 200 LBS | OXYGEN SATURATION: 98 % | BODY MASS INDEX: 35.44 KG/M2 | TEMPERATURE: 98.2 F | HEIGHT: 63 IN | SYSTOLIC BLOOD PRESSURE: 118 MMHG | HEART RATE: 56 BPM | DIASTOLIC BLOOD PRESSURE: 64 MMHG

## 2020-08-28 LAB
BASOPHILS # BLD: 0 K/UL (ref 0–0.1)
BASOPHILS NFR BLD: 0 % (ref 0–2)
DIFFERENTIAL METHOD BLD: ABNORMAL
EOSINOPHIL # BLD: 0.1 K/UL (ref 0–0.4)
EOSINOPHIL NFR BLD: 3 % (ref 0–5)
ERYTHROCYTE [DISTWIDTH] IN BLOOD BY AUTOMATED COUNT: 13.2 % (ref 11.6–14.5)
HCT VFR BLD AUTO: 34.9 % (ref 35–45)
HGB BLD-MCNC: 11.3 G/DL (ref 12–16)
LYMPHOCYTES # BLD: 1.8 K/UL (ref 0.9–3.6)
LYMPHOCYTES NFR BLD: 38 % (ref 21–52)
MCH RBC QN AUTO: 26.2 PG (ref 24–34)
MCHC RBC AUTO-ENTMCNC: 32.4 G/DL (ref 31–37)
MCV RBC AUTO: 81 FL (ref 74–97)
MONOCYTES # BLD: 0.3 K/UL (ref 0.05–1.2)
MONOCYTES NFR BLD: 7 % (ref 3–10)
NEUTS SEG # BLD: 2.4 K/UL (ref 1.8–8)
NEUTS SEG NFR BLD: 52 % (ref 40–73)
PLATELET # BLD AUTO: 433 K/UL (ref 135–420)
PMV BLD AUTO: 8.7 FL (ref 9.2–11.8)
RBC # BLD AUTO: 4.31 M/UL (ref 4.2–5.3)
SARS-COV-2, COV2NT: NOT DETECTED
SOURCE, COVRS: NORMAL
SPECIMEN TYPE, XMCV1T: NORMAL
WBC # BLD AUTO: 4.7 K/UL (ref 4.6–13.2)

## 2020-08-28 PROCEDURE — 74011250636 HC RX REV CODE- 250/636: Performed by: SURGERY

## 2020-08-28 PROCEDURE — 74011000258 HC RX REV CODE- 258: Performed by: SURGERY

## 2020-08-28 PROCEDURE — 85025 COMPLETE CBC W/AUTO DIFF WBC: CPT

## 2020-08-28 RX ORDER — METRONIDAZOLE 500 MG/1
500 TABLET ORAL 3 TIMES DAILY
Qty: 21 TAB | Refills: 0 | Status: SHIPPED | OUTPATIENT
Start: 2020-08-28

## 2020-08-28 RX ORDER — CIPROFLOXACIN 250 MG/1
500 TABLET, FILM COATED ORAL EVERY 12 HOURS
Qty: 14 TAB | Refills: 0 | Status: SHIPPED | OUTPATIENT
Start: 2020-08-28

## 2020-08-28 RX ORDER — OXYCODONE AND ACETAMINOPHEN 5; 325 MG/1; MG/1
1 TABLET ORAL
Qty: 5 TAB | Refills: 0 | Status: SHIPPED | OUTPATIENT
Start: 2020-08-28 | End: 2020-09-02

## 2020-08-28 RX ADMIN — DEXTROSE MONOHYDRATE, SODIUM CHLORIDE, AND POTASSIUM CHLORIDE 125 ML/HR: 50; 4.5; 1.49 INJECTION, SOLUTION INTRAVENOUS at 04:26

## 2020-08-28 RX ADMIN — PIPERACILLIN AND TAZOBACTAM 3.38 G: 3; .375 INJECTION, POWDER, LYOPHILIZED, FOR SOLUTION INTRAVENOUS at 04:22

## 2020-08-28 RX ADMIN — Medication 10 ML: at 05:58

## 2020-08-28 RX ADMIN — PIPERACILLIN AND TAZOBACTAM 3.38 G: 3; .375 INJECTION, POWDER, LYOPHILIZED, FOR SOLUTION INTRAVENOUS at 10:03

## 2020-08-28 NOTE — PROGRESS NOTES
Problem: Risk for Spread of Infection  Goal: Prevent transmission of infectious organism to others  Description: Prevent the transmission of infectious organisms to other patients, staff members, and visitors. 8/28/2020 1034 by Danitza Medicus  Outcome: Progressing Towards Goal  8/28/2020 1033 by Danitza Medicus  Outcome: Progressing Towards Goal     Problem: Pain  Goal: *Control of Pain  Outcome: Progressing Towards Goal  Note: No reports of pain. Problem: Falls - Risk of  Goal: *Absence of Falls  Description: Document Kayla Smalls Fall Risk and appropriate interventions in the flowsheet.   Outcome: Progressing Towards Goal  Note: No falls during shift  Fall Risk Interventions:  Mobility Interventions: Communicate number of staff needed for ambulation/transfer    Mentation Interventions: Adequate sleep, hydration, pain control, Eyeglasses and hearing aids    Medication Interventions: Patient to call before getting OOB, Teach patient to arise slowly

## 2020-08-28 NOTE — PROGRESS NOTES
AFVSS  Feels OK with no pain. Will D/C on oral abx and f/u with Dr. Danielle Heath pending and she is no symptomatic. Will f/u for her results.

## 2020-08-28 NOTE — PROGRESS NOTES
Problem: Falls - Risk of  Goal: *Absence of Falls  Description: Document Virginia Henry Fall Risk and appropriate interventions in the flowsheet.   Outcome: Progressing Towards Goal  Note: Fall Risk Interventions:  Mobility Interventions: Communicate number of staff needed for ambulation/transfer    Mentation Interventions: Adequate sleep, hydration, pain control, Update white board    Medication Interventions: Teach patient to arise slowly                   Problem: Patient Education: Go to Patient Education Activity  Goal: Patient/Family Education  Outcome: Progressing Towards Goal

## 2020-08-28 NOTE — ROUTINE PROCESS
Bedside and Verbal shift change report given to MIKEY Rodriguez RN by Heather Mckeon RN. Report included the following information SBAR, Kardex, OR Summary, Intake/Output and MAR.

## 2020-08-28 NOTE — PROGRESS NOTES
Problem: Risk for Spread of Infection  Goal: Prevent transmission of infectious organism to others  Description: Prevent the transmission of infectious organisms to other patients, staff members, and visitors. 8/28/2020 1204 by Juan Manuel Martines  Outcome: Resolved/Met  8/28/2020 1034 by Juan Manuel Martines  Outcome: Progressing Towards Goal  8/28/2020 1033 by Juan Manuel Martines  Outcome: Progressing Towards Goal     Problem: Patient Education:  Go to Education Activity  Goal: Patient/Family Education  Outcome: Resolved/Met     Problem: Pain  Goal: *Control of Pain  8/28/2020 1204 by Juan Manuel Martines  Outcome: Resolved/Met  8/28/2020 1034 by Juan Manuel Martines  Outcome: Progressing Towards Goal  Note: Pt has tylenol for HA, tessalon for cough, soreness in chest from cough.     Goal: *PALLIATIVE CARE:  Alleviation of Pain  Outcome: Resolved/Met     Problem: Patient Education: Go to Patient Education Activity  Goal: Patient/Family Education  Outcome: Resolved/Met     Problem: Patient Education: Go to Patient Education Activity  Goal: Patient/Family Education  Outcome: Resolved/Met

## 2020-08-28 NOTE — DISCHARGE INSTRUCTIONS
Patient Education   Learning About Coronavirus [phone])  Coronavirus [phone]): Overview  What is coronavirus ([id number])? The coronavirus disease (COVID-19) is caused by a virus. It is an illness that was first found in [place], [place], in December 2019. It has since spread worldwide. The virus can cause fever, cough, and trouble breathing. In severe cases, it can cause pneumonia and make it hard to breathe without help. It can cause death. Coronaviruses are a large group of viruses. They cause the common cold. They also cause more serious illnesses like Middle East respiratory syndrome (MERS) and severe acute respiratory syndrome (SARS). COVID-19 is caused by a novel coronavirus. That means it's a new type that has not been seen in people before. This virus spreads person-to-person through droplets from coughing and sneezing. It can also spread when you are close to someone who is infected. And it can spread when you touch something that has the virus on it, such as a doorknob or a tabletop. What can you do to protect yourself from coronavirus (COVID-19)? The best way to protect yourself from getting sick is to:  · Avoid areas where there is an outbreak. · Avoid contact with people who may be infected. · Wash your hands often with soap or alcohol-based hand sanitizers. · Avoid crowds and try to stay at least 6 feet away from other people. · Wash your hands often, especially after you cough or sneeze. Use soap and water, and scrub for at least 20 seconds. If soap and water aren't available, use an alcohol-based hand . · Avoid touching your mouth, nose, and eyes. What can you do to avoid spreading the virus to others? To help avoid spreading the virus to others:  · Cover your mouth with a tissue when you cough or sneeze. Then throw the tissue in the trash. · Use a disinfectant to clean things that you touch often. · Stay home if you are sick or have been exposed to the virus.  Don't go to school, work, or public areas. And don't use public transportation. · If you are sick:  ? Leave your home only if you need to get medical care. But call the doctor's office first so they know you're coming. And wear a face mask, if you have one.  ? If you have a face mask, wear it whenever you're around other people. It can help stop the spread of the virus when you cough or sneeze. ? Clean and disinfect your home every day. Use household  and disinfectant wipes or sprays. Take special care to clean things that you grab with your hands. These include doorknobs, remote controls, phones, and handles on your refrigerator and microwave. And don't forget countertops, tabletops, bathrooms, and computer keyboards. When to call for help  Call 911 anytime you think you may need emergency care. For example, call if:  · You have severe trouble breathing. (You can't talk at all.)  · You have constant chest pain or pressure. · You are severely dizzy or lightheaded. · You are confused or can't think clearly. · Your face and lips have a blue color. · You pass out (lose consciousness) or are very hard to wake up. Call your doctor now if you develop symptoms such as:  · Shortness of breath. · Fever. · Cough. If you need to get care, call ahead to the doctor's office for instructions before you go. Make sure you wear a face mask, if you have one, to prevent exposing other people to the virus. Where can you get the latest information? The following health organizations are tracking and studying this virus. Their websites contain the most up-to-date information. Sayra Chang also learn what to do if you think you may have been exposed to the virus. · U.S. Centers for Disease Control and Prevention (CDC): The CDC provides updated news about the disease and travel advice. The website also tells you how to prevent the spread of infection.  www.cdc.gov  · World Health Organization [facility]): WHO offers information about the virus outbreaks. WHO also has travel advice. www.who.int  Current as of: April 1, 2020               Content Version: 12.4  © 2006-2020 [employer]. Care instructions adapted under license by your healthcare professional. If you have questions about a medical condition or this instruction, always ask your healthcare professional. [address] any warranty or liability for your use of this information. Post-Operative Discharge Instructions  Yodit Oh. Lilly Webster M.D.  [address] [place] [address] Carmina Jorge  [phone]    Patient: Derek Moody MRN: [id number]  CSN: [id number]    YOB: 1995  Age: 30 y.o. Sex: female    DOA: 8/25/2020 LOS:  LOS: 3 days   Discharge Date:      Acute Diagnoses:  Colonic diverticular abscess [K57.20]  Diverticulitis [K57.92]    Chronic Medical Diagnoses:  Problem List as of 8/28/2020 Date Reviewed: 8/27/2020          Codes Class Noted - Resolved    COVID-19 ICD-10-CM: U07.1  ICD-9-CM: 079.89  8/26/2020 - Present        Colonic diverticular abscess ICD-10-CM: K57.20  ICD-9-CM: 562.11  8/25/2020 - Present        Diverticulitis ICD-10-CM: K57.92  ICD-9-CM: 562.11  8/25/2020 - Present              Diet  1. Resume prior to surgery diet as tolerated. Activity  1. Do not drive a car or operate any hazardous machinery the day of surgery. 2. Rest quietly today. 3. No bending or heavy lifting. 4. You may resume other prior to surgery activities as tolerated. 5. You may remove the bandage and shower in 1 day. Drain / Wound Care  1. Follow all drain / wound care instructions exactly as explained by the Nurse at time of discharge. 2. Apply an ice pack to the surgical site for 48 hours. 3. Do not put any salves or ointments on the wound. Allow it to form a dry scab. 4. Leave steri-strips / Dermabond alone. They should be allowed to fall off on their own in 7-14 days. Medications  1.  It is important to take your medications exactly as they are prescribed. 2. Keep your medication in the bottles provided by the pharmacist, and keep a list of the medication names, dosages, and times they should be taken in your wallet. Call 911 anytime you think you may need emergency care. For example, call if:  · You passed out (lost consciousness). · You have severe trouble breathing. · You have sudden chest pain and shortness of breath. Notify your Surgeon for any of the followin. Fever, chills, nausea, vomiting, severe abdominal pain or bleeding. 2. If you experience any redness or discharge or sign of infection. 3. Persistent nausea lasting more than 24 hours. If you are unable to reach your Surgeon for any of the symptoms above, you should proceed directly to the nearest Emergency Department. Post-Operative Appointment Information    Call Dr. Rivers Rank office tomorrow morning at [phone] to schedule a post-operative office visit in one (1) week. If any questions or concerns arise, call your Surgeon at [phone].

## 2020-08-28 NOTE — PROGRESS NOTES
0700 Bedside and Verbal shift change report given to MIKEY Rubin RN (oncoming nurse) by Gretchen Carney RN (offgoing nurse). Report included the following information SBAR, Kardex, Intake/Output, and MAR. Pt in bed, call light in reach. 2941 Pt assessed. No signs of acute distress. Pt in bed. Blood drawn and sent to lab. 1235 I have reviewed discharge instructions with the patient. The patient verbalized understanding.

## 2020-08-28 NOTE — PROGRESS NOTES
DC Plan: Discharge home with MD follow up, family assistance    Chart reviewed. Pt admitted to tele by MD Andrade. Called and spoke with pt this morning regarding dc plan. CM role explained. Pt independent. Pt has family support. Pt denies [facility]. Pt confirms PCP as MD Guillermo Landaverde, last saw in Jan.  Denies seeing any specialist. No dc concerns. Pt eager to dc.  CM will cont to follow for transition of care needs [phone]    Care Management Interventions  Transition of Care Consult (CM Consult): Discharge Planning  Discharge Location  Discharge Placement: Home

## 2020-08-28 NOTE — DISCHARGE SUMMARY
Discharge Summary    Patient: Kassandra Harrison MRN: [id number]  CSN: [id number]    YOB: 1995  Age: 30 y.o. Sex: female    DOA: 8/25/2020 LOS:  LOS: 3 days   Discharge Date:      Admission Diagnoses: Colonic diverticular abscess [K57.20]  Diverticulitis [K57.92]    Discharge Diagnoses:    Problem List as of 8/28/2020 Date Reviewed: 8/27/2020          Codes Class Noted - Resolved    COVID-19 ICD-10-CM: U07.1  ICD-9-CM: 079.89  8/26/2020 - Present        Colonic diverticular abscess ICD-10-CM: K57.20  ICD-9-CM: 562.11  8/25/2020 - Present        Diverticulitis ICD-10-CM: K57.92  ICD-9-CM: 562.11  8/25/2020 - Present              Discharge Condition: Good    Hospital Course: Admitted for perforated diverticultis with abscess. Responded to IV and did not need CT drainage. Also, previous COVID pos and repeat test is pending and she has had no symptoms. Consults: None    Significant Diagnostic Studies: CT    Discharge Medications:     Current Discharge Medication List      START taking these medications    Details   oxyCODONE-acetaminophen (PERCOCET) 5-325 mg per tablet Take 1 Tab by mouth every six (6) hours as needed for Pain for up to 5 days. Max Daily Amount: 4 Tabs. Qty: 5 Tab, Refills: 0    Associated Diagnoses: Colonic diverticular abscess      metroNIDAZOLE (FLAGYL) 500 mg tablet Take 1 Tab by mouth three (3) times daily. Qty: 21 Tab, Refills: 0      ciprofloxacin HCl (CIPRO) 250 mg tablet Take 2 Tabs by mouth every twelve (12) hours. Qty: 14 Tab, Refills: 0         CONTINUE these medications which have NOT CHANGED    Details   fluticasone propion-salmeteroL (ADVAIR HFA) 230-21 mcg/actuation inhaler Take 1 Puff by inhalation daily. Activity: Activity as tolerated    Diet: Regular Diet    Wound Care: None needed    Follow-up: Will go home with oral abx and see Dr. Star Turcios - Josafat Martínez in office next week. Return to ED if needed.

## 2020-08-29 ENCOUNTER — PATIENT OUTREACH (OUTPATIENT)
Dept: CASE MANAGEMENT | Age: 25
End: 2020-08-29

## 2020-08-31 ENCOUNTER — PATIENT OUTREACH (OUTPATIENT)
Dept: CASE MANAGEMENT | Age: 25
End: 2020-08-31

## 2020-08-31 NOTE — PROGRESS NOTES
.The patient was called for notification of a NEGATIVE test result for COVID-19. The following information was given to the patient:     The COVID-19 test, also known as novel coronavirus, result was negative   You probably were not infected at the time your sample was collected. However, that does not mean you will not get sick.  The test result only means that you did not have COVID-19 at the time of testing.  If you have no symptoms, continue to use preventive measures to protect yourself and others.  If you have been sick, there are many other potential infectious causes.  Contact your Primary Care Provider or Care Team for specific guidance, particularly if your symptoms worsen.  For more information visit the CDC website: DotProtection.gl     Patient returning ACM call from Sat. She has access the [employer] and received her negative results after testing positive earlier in the month.

## 2020-09-14 ENCOUNTER — PATIENT OUTREACH (OUTPATIENT)
Dept: CASE MANAGEMENT | Age: 25
End: 2020-09-14

## 2022-03-18 PROBLEM — K57.20 COLONIC DIVERTICULAR ABSCESS: Status: ACTIVE | Noted: 2020-08-25

## 2022-03-19 PROBLEM — U07.1 COVID-19: Status: ACTIVE | Noted: 2020-08-26

## 2022-03-19 PROBLEM — K57.92 DIVERTICULITIS: Status: ACTIVE | Noted: 2020-08-25

## 2023-05-23 RX ORDER — FLUTICASONE PROPIONATE AND SALMETEROL XINAFOATE 230; 21 UG/1; UG/1
1 AEROSOL, METERED RESPIRATORY (INHALATION) DAILY
COMMUNITY

## 2023-05-23 RX ORDER — METRONIDAZOLE 500 MG/1
500 TABLET ORAL 3 TIMES DAILY
COMMUNITY
Start: 2020-08-28

## 2023-05-23 RX ORDER — CIPROFLOXACIN 250 MG/1
500 TABLET, FILM COATED ORAL EVERY 12 HOURS
COMMUNITY
Start: 2020-08-28